# Patient Record
Sex: FEMALE | Race: WHITE | Employment: OTHER | URBAN - METROPOLITAN AREA
[De-identification: names, ages, dates, MRNs, and addresses within clinical notes are randomized per-mention and may not be internally consistent; named-entity substitution may affect disease eponyms.]

---

## 2023-07-30 ENCOUNTER — HOSPITAL ENCOUNTER (EMERGENCY)
Age: 88
Discharge: HOME | End: 2023-07-30
Attending: EMERGENCY MEDICINE
Payer: MEDICARE

## 2023-07-30 ENCOUNTER — APPOINTMENT (OUTPATIENT)
Dept: CT IMAGING | Age: 88
End: 2023-07-30
Payer: MEDICARE

## 2023-07-30 VITALS
WEIGHT: 130 LBS | DIASTOLIC BLOOD PRESSURE: 65 MMHG | HEART RATE: 91 BPM | RESPIRATION RATE: 18 BRPM | HEIGHT: 61 IN | TEMPERATURE: 98.9 F | BODY MASS INDEX: 24.55 KG/M2 | OXYGEN SATURATION: 99 % | SYSTOLIC BLOOD PRESSURE: 154 MMHG

## 2023-07-30 DIAGNOSIS — S01.01XA LACERATION OF SCALP, INITIAL ENCOUNTER: Primary | ICD-10-CM
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
## 2023-07-30 PROCEDURE — 99283 EMERGENCY DEPT VISIT LOW MDM: CPT

## 2023-07-30 PROCEDURE — 6370000000 HC RX 637 (ALT 250 FOR IP): Performed by: EMERGENCY MEDICINE

## 2023-07-30 PROCEDURE — 12001 RPR S/N/AX/GEN/TRNK 2.5CM/<: CPT

## 2023-07-30 RX ORDER — ACETAMINOPHEN 500 MG
1000 TABLET ORAL ONCE
Status: COMPLETED | OUTPATIENT
Start: 2023-07-30 | End: 2023-07-30

## 2023-07-30 RX ADMIN — ACETAMINOPHEN 1000 MG: 500 TABLET ORAL at 20:00

## 2023-07-30 ASSESSMENT — PAIN - FUNCTIONAL ASSESSMENT: PAIN_FUNCTIONAL_ASSESSMENT: 0-10

## 2023-07-30 ASSESSMENT — PAIN SCALES - GENERAL: PAINLEVEL_OUTOF10: 4

## 2023-07-30 NOTE — ED PROVIDER NOTES
[address] [facility] ENCOUNTER      Pt Name: Ольга Thomason  MRN: [id number]  [address] [facility] 10/24/1928  Date of evaluation: 7/30/2023  Provider: Shira Robison MD    CHIEF COMPLAINT       Chief Complaint   Patient presents with    Fall     Tripped over o2 hose, fell and hit back of head, is on thinners, dnrcc, from [address]  (Location/Symptom, Timing/Onset, Context/Setting, Quality, Duration, Modifying Factors, Severity.)   Ольга Thomason is a 80 y.o. female who presents to the emergency department after a fall. She tripped over her oxygen hose, fell and hit the back of her head. She is on blood thinners and is bleeding profusely per EMS. She is a DNR CC and family they do not want any imaging. They just want the laceration should she have one fixed. She is from the [address]. She tells me she really does not have any other pain except for the back of the head and only when we touch it    Nursing Notes were reviewed. REVIEW OF SYSTEMS    (2-9 systems for level 4, 10 or more for level 5)     Review of Systems   Constitutional:  Negative for activity change, appetite change, chills, fatigue and fever. HENT:  Negative for congestion, ear pain and sore throat. Eyes:  Negative for pain, discharge and redness. Respiratory:  Negative for cough, shortness of breath, wheezing and stridor. Cardiovascular:  Negative for chest pain. Gastrointestinal:  Negative for abdominal pain, constipation, diarrhea, nausea and vomiting. Genitourinary:  Negative for decreased urine volume and difficulty urinating. Musculoskeletal:  Negative for arthralgias and myalgias. Skin:  Positive for wound. Negative for color change and rash. Neurological:  Negative for dizziness, weakness and headaches. Psychiatric/Behavioral:  Negative for behavioral problems and confusion.       Except as noted above the

## 2023-07-31 ASSESSMENT — ENCOUNTER SYMPTOMS
COLOR CHANGE: 0
WHEEZING: 0
COUGH: 0
SHORTNESS OF BREATH: 0
EYE REDNESS: 0
EYE DISCHARGE: 0
STRIDOR: 0
NAUSEA: 0
VOMITING: 0
SORE THROAT: 0
ABDOMINAL PAIN: 0
CONSTIPATION: 0
EYE PAIN: 0
DIARRHEA: 0